# Patient Record
Sex: MALE | Race: WHITE
[De-identification: names, ages, dates, MRNs, and addresses within clinical notes are randomized per-mention and may not be internally consistent; named-entity substitution may affect disease eponyms.]

---

## 2019-09-12 ENCOUNTER — HOSPITAL ENCOUNTER (INPATIENT)
Dept: HOSPITAL 92 - ERS | Age: 44
LOS: 1 days | Discharge: LEFT BEFORE DISCHARGE | DRG: 309 | End: 2019-09-13
Attending: INTERNAL MEDICINE | Admitting: INTERNAL MEDICINE
Payer: SELF-PAY

## 2019-09-12 ENCOUNTER — HOSPITAL ENCOUNTER (EMERGENCY)
Dept: HOSPITAL 57 - BURERS | Age: 44
Discharge: ANOTHER HOSPITAL | End: 2019-09-12
Payer: SELF-PAY

## 2019-09-12 VITALS — BODY MASS INDEX: 38.3 KG/M2

## 2019-09-12 DIAGNOSIS — I16.0: ICD-10-CM

## 2019-09-12 DIAGNOSIS — Z85.828: ICD-10-CM

## 2019-09-12 DIAGNOSIS — F17.210: ICD-10-CM

## 2019-09-12 DIAGNOSIS — I47.1: Primary | ICD-10-CM

## 2019-09-12 DIAGNOSIS — R00.0: ICD-10-CM

## 2019-09-12 DIAGNOSIS — E87.6: ICD-10-CM

## 2019-09-12 DIAGNOSIS — E87.2: ICD-10-CM

## 2019-09-12 DIAGNOSIS — E87.6: Primary | ICD-10-CM

## 2019-09-12 DIAGNOSIS — M54.9: ICD-10-CM

## 2019-09-12 DIAGNOSIS — F10.239: ICD-10-CM

## 2019-09-12 DIAGNOSIS — E86.0: ICD-10-CM

## 2019-09-12 DIAGNOSIS — I10: ICD-10-CM

## 2019-09-12 DIAGNOSIS — Z91.14: ICD-10-CM

## 2019-09-12 DIAGNOSIS — G89.29: ICD-10-CM

## 2019-09-12 DIAGNOSIS — E83.42: ICD-10-CM

## 2019-09-12 DIAGNOSIS — E87.1: ICD-10-CM

## 2019-09-12 LAB
ALBUMIN SERPL BCG-MCNC: 4.4 G/DL (ref 3.5–5)
ALP SERPL-CCNC: 74 U/L (ref 40–150)
ALT SERPL W P-5'-P-CCNC: 24 U/L (ref 8–55)
ANION GAP SERPL CALC-SCNC: 11 MMOL/L (ref 10–20)
ANION GAP SERPL CALC-SCNC: 18 MMOL/L (ref 10–20)
APAP SERPL-MCNC: (no result) MCG/ML (ref 10–30)
AST SERPL-CCNC: 20 U/L (ref 5–34)
BASOPHILS # BLD AUTO: 0.1 THOU/UL (ref 0–0.2)
BASOPHILS NFR BLD AUTO: 0.6 % (ref 0–1)
BILIRUB SERPL-MCNC: 0.7 MG/DL (ref 0.2–1.2)
BUN SERPL-MCNC: (no result) MG/DL (ref 8.9–20.6)
BUN SERPL-MCNC: (no result) MG/DL (ref 8.9–20.6)
CALCIUM SERPL-MCNC: 8.5 MG/DL (ref 7.8–10.44)
CALCIUM SERPL-MCNC: 9.3 MG/DL (ref 7.8–10.44)
CHLORIDE SERPL-SCNC: 103 MMOL/L (ref 98–107)
CHLORIDE SERPL-SCNC: 97 MMOL/L (ref 98–107)
CO2 SERPL-SCNC: 25 MMOL/L (ref 22–29)
CO2 SERPL-SCNC: 25 MMOL/L (ref 22–29)
CREAT CL PREDICTED SERPL C-G-VRATE: 0 ML/MIN (ref 70–130)
CREAT CL PREDICTED SERPL C-G-VRATE: 0 ML/MIN (ref 70–130)
DRUG SCREEN CUTOFF: (no result)
EOSINOPHIL # BLD AUTO: 0 THOU/UL (ref 0–0.7)
EOSINOPHIL NFR BLD AUTO: 0.2 % (ref 0–10)
GLOBULIN SER CALC-MCNC: 3.6 G/DL (ref 2.4–3.5)
GLUCOSE SERPL-MCNC: 103 MG/DL (ref 70–105)
GLUCOSE SERPL-MCNC: 110 MG/DL (ref 70–105)
HGB BLD-MCNC: 17.1 G/DL (ref 14–18)
LIPASE SERPL-CCNC: 6 U/L (ref 8–78)
LYMPHOCYTES # BLD AUTO: 1.7 THOU/UL (ref 1.2–3.4)
LYMPHOCYTES NFR BLD AUTO: 14 % (ref 21–51)
MCH RBC QN AUTO: 32.2 PG (ref 27–31)
MCV RBC AUTO: 95.6 FL (ref 78–98)
MEDTOX CONTROL LINE VALID?: (no result)
MONOCYTES # BLD AUTO: 0.6 THOU/UL (ref 0.11–0.59)
MONOCYTES NFR BLD AUTO: 5.3 % (ref 0–10)
NEUTROPHILS # BLD AUTO: 9.5 THOU/UL (ref 1.4–6.5)
NEUTROPHILS NFR BLD AUTO: 79.8 % (ref 42–75)
PLATELET # BLD AUTO: 315 THOU/UL (ref 130–400)
POTASSIUM SERPL-SCNC: 2.8 MMOL/L (ref 3.5–5.1)
POTASSIUM SERPL-SCNC: 3.3 MMOL/L (ref 3.5–5.1)
RBC # BLD AUTO: 5.29 MILL/UL (ref 4.7–6.1)
RBC UR QL AUTO: (no result) HPF (ref 0–3)
SALICYLATES SERPL-MCNC: (no result) MG/DL (ref 15–30)
SODIUM SERPL-SCNC: 136 MMOL/L (ref 136–145)
SODIUM SERPL-SCNC: 137 MMOL/L (ref 136–145)
T4 FREE SERPL-MCNC: 0.98 NG/DL (ref 0.7–1.48)
WBC # BLD AUTO: 11.9 THOU/UL (ref 4.8–10.8)

## 2019-09-12 PROCEDURE — 96375 TX/PRO/DX INJ NEW DRUG ADDON: CPT

## 2019-09-12 PROCEDURE — 96365 THER/PROPH/DIAG IV INF INIT: CPT

## 2019-09-12 PROCEDURE — 84439 ASSAY OF FREE THYROXINE: CPT

## 2019-09-12 PROCEDURE — 36415 COLL VENOUS BLD VENIPUNCTURE: CPT

## 2019-09-12 PROCEDURE — 83690 ASSAY OF LIPASE: CPT

## 2019-09-12 PROCEDURE — 81015 MICROSCOPIC EXAM OF URINE: CPT

## 2019-09-12 PROCEDURE — 84484 ASSAY OF TROPONIN QUANT: CPT

## 2019-09-12 PROCEDURE — 80053 COMPREHEN METABOLIC PANEL: CPT

## 2019-09-12 PROCEDURE — 96376 TX/PRO/DX INJ SAME DRUG ADON: CPT

## 2019-09-12 PROCEDURE — S0028 INJECTION, FAMOTIDINE, 20 MG: HCPCS

## 2019-09-12 PROCEDURE — 83735 ASSAY OF MAGNESIUM: CPT

## 2019-09-12 PROCEDURE — 93005 ELECTROCARDIOGRAM TRACING: CPT

## 2019-09-12 PROCEDURE — 84443 ASSAY THYROID STIM HORMONE: CPT

## 2019-09-12 PROCEDURE — 80306 DRUG TEST PRSMV INSTRMNT: CPT

## 2019-09-12 PROCEDURE — 71045 X-RAY EXAM CHEST 1 VIEW: CPT

## 2019-09-12 PROCEDURE — 81003 URINALYSIS AUTO W/O SCOPE: CPT

## 2019-09-12 PROCEDURE — 96367 TX/PROPH/DG ADDL SEQ IV INF: CPT

## 2019-09-12 PROCEDURE — 96374 THER/PROPH/DIAG INJ IV PUSH: CPT

## 2019-09-12 PROCEDURE — 83605 ASSAY OF LACTIC ACID: CPT

## 2019-09-12 PROCEDURE — 80307 DRUG TEST PRSMV CHEM ANLYZR: CPT

## 2019-09-12 PROCEDURE — 85379 FIBRIN DEGRADATION QUANT: CPT

## 2019-09-12 PROCEDURE — 85025 COMPLETE CBC W/AUTO DIFF WBC: CPT

## 2019-09-12 PROCEDURE — 84481 FREE ASSAY (FT-3): CPT

## 2019-09-12 PROCEDURE — 80048 BASIC METABOLIC PNL TOTAL CA: CPT

## 2019-09-12 PROCEDURE — 82550 ASSAY OF CK (CPK): CPT

## 2019-09-12 RX ADMIN — FAMOTIDINE SCH MG: 10 INJECTION, SOLUTION INTRAVENOUS at 21:17

## 2019-09-12 NOTE — RAD
PORTABLE CHEST:

9/12/19

 

An AP portable film at 1348 shows a normal sized heart and clear lungs. No infiltrate, effusion or co
ngestion was seen. The mediastinum appears normal and the trachea is midline. 

 

IMPRESSION: 

No acute thoracic findings. 

 

POS: HOME

## 2019-09-13 VITALS — SYSTOLIC BLOOD PRESSURE: 177 MMHG | DIASTOLIC BLOOD PRESSURE: 107 MMHG | TEMPERATURE: 99.5 F

## 2019-09-13 LAB
ANION GAP SERPL CALC-SCNC: 11 MMOL/L (ref 10–20)
BUN SERPL-MCNC: 4 MG/DL (ref 8.9–20.6)
CALCIUM SERPL-MCNC: 8.3 MG/DL (ref 7.8–10.44)
CHLORIDE SERPL-SCNC: 105 MMOL/L (ref 98–107)
CO2 SERPL-SCNC: 23 MMOL/L (ref 22–29)
CREAT CL PREDICTED SERPL C-G-VRATE: 174 ML/MIN (ref 70–130)
GLUCOSE SERPL-MCNC: 100 MG/DL (ref 70–105)
POTASSIUM SERPL-SCNC: 3.6 MMOL/L (ref 3.5–5.1)
SODIUM SERPL-SCNC: 135 MMOL/L (ref 136–145)

## 2019-09-13 RX ADMIN — FAMOTIDINE SCH MG: 10 INJECTION, SOLUTION INTRAVENOUS at 08:59

## 2019-09-13 NOTE — HP
CHIEF COMPLAINT:  Palpitations.



HISTORY OF PRESENT ILLNESS:  Mr. Messina is a 44-year-old male with past medical

history of hypertension, noncompliant with medications, presented to [facility] ED with

palpitations.  The patient was found to have heart rate of 145.  As per records, the

patient was given Cardizem, adenosine, and Ativan.  The patient drinks alcohol

chronically.  The patient also was found to be hypokalemic with a potassium of 2.8.

Medications given in the emergency room included Cardizem, adenosine, and Ativan.

The patient supposed to be taking lisinopril and atenolol but ran out of the

medications several weeks ago.  The patient is being admitted to the hospital for

further management. 



PAST MEDICAL HISTORY:  Hypertension, noncompliant with medications.



PAST SURGICAL HISTORY:  

1. Hand surgery.

2. Low back surgery.

3. Skin cancer removal.



SOCIAL HISTORY:  The patient drinks alcohol every day, more than 5 drinks per day.

He denies any other drug abuse.  The patient currently uses cigarettes, 1 pack a

day. 



FAMILY HISTORY:  Reviewed and noncontributory.



REVIEW OF SYSTEMS:  Review of 14-systems negative except what was mentioned in the

history of present illness. 



PHYSICAL EXAMINATION:

GENERAL:  The patient is awake, alert, anxious, not in acute distress. 

VITAL SIGNS:  Blood pressure is 160/105, heart rate is 104, respiratory rate is 20,

and pulse oximetry is 98% on room air. 

HEAD:  Normocephalic and atraumatic. 

NECK:  Supple.  No JVD. 

CHEST:  Fair bilateral air entry. 

HEART:  S1 and S2, regular, tachycardic. 

ABDOMEN:  Soft, nontender.  Bowel sounds present. 

NEURO:  Awake, alert, and oriented x3. 

PSYCH:  Anxious. 

EXTREMITIES:  No clubbing or cyanosis.



LABORATORY DATA:  Potassium was 2.8, repeat potassium is 3.3.



ASSESSMENT:  

1. Hypertensive urgency.

2. Tachycardia.

3. Hypokalemia.

4. Alcoholism with withdrawal.

5. Noncompliance.



PLAN:  

1. Admit.

2. IV fluid hydration.

3. Monitor and control blood pressure.

4. Replace potassium.

5. Add multivitamins __________ and folic acid.

6. Reconcile home meds.

7. DVT prophylaxis, early ambulation.







Job ID:  [id number]

## 2019-09-13 NOTE — DIS
DATE OF ADMISSION:  09/12/2019



DATE OF DISCHARGE:  09/13/2019



DISCHARGE DISPOSITION:  Home.



FOLLOWUP:  Follow up with primary care physician, Dr. Faby Bassett in 1 week. 



The patient was seen on the day of discharge.  Denies any new complaints.  The

patient was extensively counseled on blood pressure management.  A prescription for

amlodipine 5 mg daily, carvedilol 6.25 mg b.i.d. as well as lisinopril 10 mg daily

was provided for 1 month.  He was also prescribed clonidine as needed for systolic

blood pressure over 180. 



BRIEF HOSPITAL COURSE:  The patient is a 44-year-old male with hypertension,

noncompliant with medications, presented to the hospital with palpitations at

[facility].  His workup was consistent with supraventricular

tachycardia along with hypertensive crisis.  His blood pressure on ER arrival was

225/150 with heart rate of 145.  He received several medications including

adenosine, Cardizem, Ativan in the emergency room.  He was found to have potassium

of 2.8 on admission, which was replaced.  He was monitored on the telemetry unit.

This morning, his blood pressure was 177/107 and 169/105 with a heart rate of 98.

The patient signed against medical advise due to personal reason.  He is not stable

for discharge.  He was extensively counseled to be compliant with antihypertensive

medications.  He understands the risks, not limited to life-threatening

complications including death, by signing against medical advice.  He will benefit

from a repeat basic metabolic profile after 1 week. 



FINAL DIAGNOSES:  

1. Hypertensive urgency.

2. Supraventricular tachycardia, status post adenosine.

3. Chronic low back pain.

4. Medication noncompliance.

5. Hypokalemia.

6. Hypomagnesemia, replaced.

7. Dehydration with lactic acidosis on admission.

8. Hyponatremia.



PLAN:  Plan of care was discussed with the patient in detail.  He stated

understanding. 







Job ID:  [id number]